# Patient Record
Sex: FEMALE | Race: WHITE | NOT HISPANIC OR LATINO | Employment: FULL TIME | URBAN - METROPOLITAN AREA
[De-identification: names, ages, dates, MRNs, and addresses within clinical notes are randomized per-mention and may not be internally consistent; named-entity substitution may affect disease eponyms.]

---

## 2019-02-05 ENCOUNTER — HOSPITAL ENCOUNTER (OUTPATIENT)
Dept: OTHER | Facility: HOSPITAL | Age: 24
Discharge: HOME | End: 2019-02-05

## 2019-02-05 LAB
ALBUMIN SERPL-MCNC: 4.2 G/DL (ref 3.5–5)
ALBUMIN/GLOB SERPL: 1.4 {RATIO} (ref 1.4–2.6)
ALP SERPL-CCNC: 46 U/L (ref 42–98)
ALT SERPL-CCNC: 35 U/L (ref 10–40)
ANION GAP SERPL CALC-SCNC: 16 MMOL/L (ref 8–19)
AST SERPL-CCNC: 31 U/L (ref 15–50)
BASOPHILS # BLD AUTO: 0.03 10*3/UL (ref 0–0.2)
BASOPHILS NFR BLD AUTO: 0.87 % (ref 0–3)
BILIRUB SERPL-MCNC: 0.63 MG/DL (ref 0.2–1.3)
BUN SERPL-MCNC: 8 MG/DL (ref 5–25)
BUN/CREAT SERPL: 13 {RATIO} (ref 6–20)
CALCIUM SERPL-MCNC: 9.5 MG/DL (ref 8.7–10.4)
CHLORIDE SERPL-SCNC: 104 MMOL/L (ref 99–111)
CHOLEST SERPL-MCNC: 163 MG/DL (ref 107–200)
CHOLEST/HDLC SERPL: 2.8 {RATIO} (ref 3–6)
CONV CO2: 26 MMOL/L (ref 22–32)
CONV TOTAL PROTEIN: 7.2 G/DL (ref 6.3–8.2)
CREAT UR-MCNC: 0.6 MG/DL (ref 0.5–0.9)
EOSINOPHIL # BLD AUTO: 0.21 10*3/UL (ref 0–0.7)
EOSINOPHIL # BLD AUTO: 6.72 % (ref 0–7)
ERYTHROCYTE [DISTWIDTH] IN BLOOD BY AUTOMATED COUNT: 11.2 % (ref 11.5–14.5)
GFR SERPLBLD BASED ON 1.73 SQ M-ARVRAT: >60 ML/MIN/{1.73_M2}
GLOBULIN UR ELPH-MCNC: 3 G/DL (ref 2–3.5)
GLUCOSE SERPL-MCNC: 76 MG/DL (ref 65–99)
HBA1C MFR BLD: 15 G/DL (ref 12–16)
HCT VFR BLD AUTO: 43.3 % (ref 37–47)
HDLC SERPL-MCNC: 58 MG/DL (ref 40–60)
LDLC SERPL CALC-MCNC: 88 MG/DL (ref 70–100)
LYMPHOCYTES # BLD AUTO: 1.43 10*3/UL (ref 1–5)
MCH RBC QN AUTO: 32 PG (ref 27–31)
MCHC RBC AUTO-ENTMCNC: 34.5 G/DL (ref 33–37)
MCV RBC AUTO: 92.8 FL (ref 81–99)
MONOCYTES # BLD AUTO: 0.32 10*3/UL (ref 0.2–1.2)
MONOCYTES NFR BLD AUTO: 10.4 % (ref 3–10)
NEUTROPHILS # BLD AUTO: 1.1 10*3/UL (ref 2–8)
NEUTROPHILS NFR BLD AUTO: 35.8 % (ref 30–85)
NRBC BLD AUTO-RTO: 0 % (ref 0–0.01)
OSMOLALITY SERPL CALC.SUM OF ELEC: 291 MOSM/KG (ref 273–304)
PLATELET # BLD AUTO: 227 10*3/UL (ref 130–400)
PMV BLD AUTO: 7.4 FL (ref 7.4–10.4)
POTASSIUM SERPL-SCNC: 4.3 MMOL/L (ref 3.5–5.3)
RBC # BLD AUTO: 4.67 10*6/UL (ref 4.2–5.4)
SODIUM SERPL-SCNC: 142 MMOL/L (ref 135–147)
TRIGL SERPL-MCNC: 83 MG/DL (ref 40–150)
TSH SERPL-ACNC: 1.48 M[IU]/L (ref 0.27–4.2)
VARIANT LYMPHS NFR BLD MANUAL: 46.3 % (ref 20–45)
VLDLC SERPL-MCNC: 17 MG/DL (ref 5–37)
WBC # BLD AUTO: 3.08 10*3/UL (ref 4.8–10.8)

## 2019-03-05 ENCOUNTER — HOSPITAL ENCOUNTER (OUTPATIENT)
Dept: OTHER | Facility: HOSPITAL | Age: 24
Discharge: HOME | End: 2019-03-05
Attending: NURSE PRACTITIONER

## 2019-03-05 LAB
ALT SERPL-CCNC: 11 U/L (ref 10–40)
AST SERPL-CCNC: 14 U/L (ref 15–50)
BASOPHILS # BLD AUTO: 0.03 10*3/UL (ref 0–0.2)
BASOPHILS NFR BLD AUTO: 0.8 % (ref 0–3)
CONV ABS IMM GRAN: 0 10*3/UL (ref 0–0.2)
CONV IMMATURE GRAN: 0 % (ref 0–1.8)
DEPRECATED RDW RBC AUTO: 43.7 FL (ref 36.4–46.3)
EOSINOPHIL # BLD AUTO: 0.29 10*3/UL (ref 0–0.7)
EOSINOPHIL # BLD AUTO: 7.3 % (ref 0–7)
ERYTHROCYTE [DISTWIDTH] IN BLOOD BY AUTOMATED COUNT: 12.4 % (ref 11.7–14.4)
HBA1C MFR BLD: 14.1 G/DL (ref 12–16)
HCT VFR BLD AUTO: 43 % (ref 37–47)
LYMPHOCYTES # BLD AUTO: 1.19 10*3/UL (ref 1–5)
MCH RBC QN AUTO: 31.5 PG (ref 27–31)
MCHC RBC AUTO-ENTMCNC: 32.8 G/DL (ref 33–37)
MCV RBC AUTO: 96.2 FL (ref 81–99)
MONOCYTES # BLD AUTO: 0.4 10*3/UL (ref 0.2–1.2)
MONOCYTES NFR BLD AUTO: 10.1 % (ref 3–10)
NEUTROPHILS # BLD AUTO: 2.04 10*3/UL (ref 2–8)
NEUTROPHILS NFR BLD AUTO: 51.7 % (ref 30–85)
NRBC CBCN: 0 % (ref 0–0.7)
PLATELET # BLD AUTO: 225 10*3/UL (ref 130–400)
PMV BLD AUTO: 9.8 FL (ref 9.4–12.3)
RBC # BLD AUTO: 4.47 10*6/UL (ref 4.2–5.4)
VARIANT LYMPHS NFR BLD MANUAL: 30.1 % (ref 20–45)
WBC # BLD AUTO: 3.95 10*3/UL (ref 4.8–10.8)

## 2019-05-17 ENCOUNTER — HOSPITAL ENCOUNTER (OUTPATIENT)
Dept: URGENT CARE | Facility: CLINIC | Age: 24
Discharge: HOME | End: 2019-05-17
Attending: FAMILY MEDICINE

## 2019-05-31 ENCOUNTER — HOSPITAL ENCOUNTER (OUTPATIENT)
Dept: URGENT CARE | Facility: CLINIC | Age: 24
Discharge: HOME | End: 2019-05-31

## 2019-06-02 LAB
AMOXICILLIN+CLAV SUSC ISLT: 4
AMPICILLIN SUSC ISLT: >=32
AMPICILLIN+SULBAC SUSC ISLT: 8
BACTERIA UR CULT: ABNORMAL
CEFAZOLIN SUSC ISLT: <=4
CEFEPIME SUSC ISLT: <=1
CEFTAZIDIME SUSC ISLT: <=1
CEFTRIAXONE SUSC ISLT: <=1
CEFUROXIME ORAL SUSC ISLT: <=1
CEFUROXIME PARENTER SUSC ISLT: <=1
CIPROFLOXACIN SUSC ISLT: <=0.25
ERTAPENEM SUSC ISLT: <=0.5
GENTAMICIN SUSC ISLT: <=1
LEVOFLOXACIN SUSC ISLT: <=0.12
NITROFURANTOIN SUSC ISLT: 64
TETRACYCLINE SUSC ISLT: <=1
TMP SMX SUSC ISLT: <=20
TOBRAMYCIN SUSC ISLT: <=1

## 2019-08-09 ENCOUNTER — HOSPITAL ENCOUNTER (OUTPATIENT)
Dept: URGENT CARE | Facility: CLINIC | Age: 24
Discharge: HOME | End: 2019-08-09
Attending: NURSE PRACTITIONER

## 2019-08-11 LAB — BACTERIA SPEC AEROBE CULT: NORMAL

## 2019-11-08 ENCOUNTER — HOSPITAL ENCOUNTER (OUTPATIENT)
Dept: URGENT CARE | Facility: CLINIC | Age: 24
Discharge: HOME | End: 2019-11-08
Attending: NURSE PRACTITIONER

## 2019-11-10 LAB — BACTERIA UR CULT: NORMAL

## 2019-11-17 ENCOUNTER — HOSPITAL ENCOUNTER (OUTPATIENT)
Dept: URGENT CARE | Facility: CLINIC | Age: 24
Discharge: HOME | End: 2019-11-17

## 2019-11-19 LAB — BACTERIA UR CULT: NORMAL

## 2020-01-31 ENCOUNTER — HOSPITAL ENCOUNTER (OUTPATIENT)
Dept: OTHER | Facility: HOSPITAL | Age: 25
Discharge: HOME | End: 2020-01-31

## 2020-01-31 LAB
ALBUMIN SERPL-MCNC: 4.3 G/DL (ref 3.5–5)
ALBUMIN/GLOB SERPL: 1.5 {RATIO} (ref 1.4–2.6)
ALP SERPL-CCNC: 53 U/L (ref 42–98)
ALT SERPL-CCNC: 23 U/L (ref 10–40)
ANION GAP SERPL CALC-SCNC: 14 MMOL/L (ref 8–19)
AST SERPL-CCNC: 19 U/L (ref 15–50)
BASOPHILS # BLD AUTO: 0.02 10*3/UL (ref 0–0.2)
BASOPHILS NFR BLD AUTO: 0.5 % (ref 0–3)
BILIRUB SERPL-MCNC: 0.54 MG/DL (ref 0.2–1.3)
BUN SERPL-MCNC: 9 MG/DL (ref 5–25)
BUN/CREAT SERPL: 15 {RATIO} (ref 6–20)
CALCIUM SERPL-MCNC: 9.4 MG/DL (ref 8.7–10.4)
CHLORIDE SERPL-SCNC: 101 MMOL/L (ref 99–111)
CHOLEST SERPL-MCNC: 174 MG/DL (ref 107–200)
CHOLEST/HDLC SERPL: 3.7 {RATIO} (ref 3–6)
CONV ABS IMM GRAN: 0.01 10*3/UL (ref 0–0.2)
CONV CO2: 25 MMOL/L (ref 22–32)
CONV IMMATURE GRAN: 0.2 % (ref 0–1.8)
CONV TOTAL PROTEIN: 7.2 G/DL (ref 6.3–8.2)
CREAT UR-MCNC: 0.6 MG/DL (ref 0.5–0.9)
DEPRECATED RDW RBC AUTO: 41.4 FL (ref 36.4–46.3)
EOSINOPHIL # BLD AUTO: 0.23 10*3/UL (ref 0–0.7)
EOSINOPHIL # BLD AUTO: 5.4 % (ref 0–7)
ERYTHROCYTE [DISTWIDTH] IN BLOOD BY AUTOMATED COUNT: 12 % (ref 11.7–14.4)
GFR SERPLBLD BASED ON 1.73 SQ M-ARVRAT: >60 ML/MIN/{1.73_M2}
GLOBULIN UR ELPH-MCNC: 2.9 G/DL (ref 2–3.5)
GLUCOSE SERPL-MCNC: 87 MG/DL (ref 65–99)
HCT VFR BLD AUTO: 43.6 % (ref 37–47)
HDLC SERPL-MCNC: 47 MG/DL (ref 40–60)
HGB BLD-MCNC: 14.4 G/DL (ref 12–16)
LDLC SERPL CALC-MCNC: 105 MG/DL (ref 70–100)
LYMPHOCYTES # BLD AUTO: 1.38 10*3/UL (ref 1–5)
LYMPHOCYTES NFR BLD AUTO: 32.2 % (ref 20–45)
MCH RBC QN AUTO: 31.2 PG (ref 27–31)
MCHC RBC AUTO-ENTMCNC: 33 G/DL (ref 33–37)
MCV RBC AUTO: 94.6 FL (ref 81–99)
MONOCYTES # BLD AUTO: 0.37 10*3/UL (ref 0.2–1.2)
MONOCYTES NFR BLD AUTO: 8.6 % (ref 3–10)
NEUTROPHILS # BLD AUTO: 2.28 10*3/UL (ref 2–8)
NEUTROPHILS NFR BLD AUTO: 53.1 % (ref 30–85)
NRBC CBCN: 0 % (ref 0–0.7)
OSMOLALITY SERPL CALC.SUM OF ELEC: 280 MOSM/KG (ref 273–304)
PLATELET # BLD AUTO: 254 10*3/UL (ref 130–400)
PMV BLD AUTO: 9.7 FL (ref 9.4–12.3)
POTASSIUM SERPL-SCNC: 4.3 MMOL/L (ref 3.5–5.3)
RBC # BLD AUTO: 4.61 10*6/UL (ref 4.2–5.4)
SODIUM SERPL-SCNC: 136 MMOL/L (ref 135–147)
TRIGL SERPL-MCNC: 108 MG/DL (ref 40–150)
TSH SERPL-ACNC: 2.44 M[IU]/L (ref 0.27–4.2)
VLDLC SERPL-MCNC: 22 MG/DL (ref 5–37)
WBC # BLD AUTO: 4.29 10*3/UL (ref 4.8–10.8)

## 2020-07-08 ENCOUNTER — HOSPITAL ENCOUNTER (OUTPATIENT)
Dept: URGENT CARE | Facility: CLINIC | Age: 25
Discharge: HOME | End: 2020-07-08
Attending: FAMILY MEDICINE

## 2020-10-21 ENCOUNTER — HOSPITAL ENCOUNTER (OUTPATIENT)
Dept: OTHER | Facility: HOSPITAL | Age: 25
Discharge: HOME | End: 2020-10-21
Attending: OBSTETRICS & GYNECOLOGY

## 2020-10-21 LAB
C TRACH RRNA CVX QL NAA+PROBE: NOT DETECTED
N GONORRHOEA DNA SPEC QL NAA+PROBE: NOT DETECTED

## 2020-10-27 ENCOUNTER — HOSPITAL ENCOUNTER (OUTPATIENT)
Dept: URGENT CARE | Facility: CLINIC | Age: 25
Discharge: HOME | End: 2020-10-27
Attending: FAMILY MEDICINE

## 2020-10-27 LAB
CONV LAST MENSTURAL PERIOD: NORMAL
SPECIMEN SOURCE: NORMAL
SPECIMEN SOURCE: NORMAL
THIN PREP CVX: NORMAL

## 2020-10-31 LAB — SARS-COV-2 RNA SPEC QL NAA+PROBE: DETECTED

## 2021-01-20 ENCOUNTER — HOSPITAL ENCOUNTER (OUTPATIENT)
Dept: OTHER | Facility: HOSPITAL | Age: 26
Discharge: HOME | End: 2021-01-20

## 2021-01-20 LAB
ALBUMIN SERPL-MCNC: 4 G/DL (ref 3.5–5)
ALBUMIN/GLOB SERPL: 1.5 {RATIO} (ref 1.4–2.6)
ALP SERPL-CCNC: 52 U/L (ref 42–98)
ALT SERPL-CCNC: 21 U/L (ref 10–40)
ANION GAP SERPL CALC-SCNC: 14 MMOL/L (ref 8–19)
AST SERPL-CCNC: 18 U/L (ref 15–50)
BASOPHILS # BLD AUTO: 0.03 10*3/UL (ref 0–0.2)
BASOPHILS NFR BLD AUTO: 0.5 % (ref 0–3)
BILIRUB SERPL-MCNC: 0.28 MG/DL (ref 0.2–1.3)
BUN SERPL-MCNC: 10 MG/DL (ref 5–25)
BUN/CREAT SERPL: 18 {RATIO} (ref 6–20)
CALCIUM SERPL-MCNC: 8.8 MG/DL (ref 8.7–10.4)
CHLORIDE SERPL-SCNC: 105 MMOL/L (ref 99–111)
CHOLEST SERPL-MCNC: 153 MG/DL (ref 107–200)
CHOLEST/HDLC SERPL: 2.6 {RATIO} (ref 3–6)
CONV ABS IMM GRAN: 0.01 10*3/UL (ref 0–0.2)
CONV CO2: 23 MMOL/L (ref 22–32)
CONV IMMATURE GRAN: 0.2 % (ref 0–1.8)
CONV TOTAL PROTEIN: 6.6 G/DL (ref 6.3–8.2)
CREAT UR-MCNC: 0.57 MG/DL (ref 0.5–0.9)
DEPRECATED RDW RBC AUTO: 41.1 FL (ref 36.4–46.3)
EOSINOPHIL # BLD AUTO: 0.28 10*3/UL (ref 0–0.7)
EOSINOPHIL # BLD AUTO: 5.1 % (ref 0–7)
ERYTHROCYTE [DISTWIDTH] IN BLOOD BY AUTOMATED COUNT: 12 % (ref 11.7–14.4)
GFR SERPLBLD BASED ON 1.73 SQ M-ARVRAT: >60 ML/MIN/{1.73_M2}
GLOBULIN UR ELPH-MCNC: 2.6 G/DL (ref 2–3.5)
GLUCOSE SERPL-MCNC: 96 MG/DL (ref 65–99)
HCT VFR BLD AUTO: 42.4 % (ref 37–47)
HDLC SERPL-MCNC: 59 MG/DL (ref 40–60)
HGB BLD-MCNC: 14.3 G/DL (ref 12–16)
LDLC SERPL CALC-MCNC: 81 MG/DL (ref 70–100)
LYMPHOCYTES # BLD AUTO: 2.55 10*3/UL (ref 1–5)
LYMPHOCYTES NFR BLD AUTO: 46.3 % (ref 20–45)
MCH RBC QN AUTO: 31.2 PG (ref 27–31)
MCHC RBC AUTO-ENTMCNC: 33.7 G/DL (ref 33–37)
MCV RBC AUTO: 92.6 FL (ref 81–99)
MONOCYTES # BLD AUTO: 0.51 10*3/UL (ref 0.2–1.2)
MONOCYTES NFR BLD AUTO: 9.3 % (ref 3–10)
NEUTROPHILS # BLD AUTO: 2.13 10*3/UL (ref 2–8)
NEUTROPHILS NFR BLD AUTO: 38.6 % (ref 30–85)
NRBC CBCN: 0 % (ref 0–0.7)
OSMOLALITY SERPL CALC.SUM OF ELEC: 285 MOSM/KG (ref 273–304)
PLATELET # BLD AUTO: 282 10*3/UL (ref 130–400)
PMV BLD AUTO: 9.5 FL (ref 9.4–12.3)
POTASSIUM SERPL-SCNC: 3.9 MMOL/L (ref 3.5–5.3)
RBC # BLD AUTO: 4.58 10*6/UL (ref 4.2–5.4)
SODIUM SERPL-SCNC: 138 MMOL/L (ref 135–147)
TRIGL SERPL-MCNC: 65 MG/DL (ref 40–150)
TSH SERPL-ACNC: 3.76 M[IU]/L (ref 0.27–4.2)
VLDLC SERPL-MCNC: 13 MG/DL (ref 5–37)
WBC # BLD AUTO: 5.51 10*3/UL (ref 4.8–10.8)

## 2021-01-29 ENCOUNTER — HOSPITAL ENCOUNTER (OUTPATIENT)
Dept: URGENT CARE | Facility: CLINIC | Age: 26
Discharge: HOME | End: 2021-01-29
Attending: NURSE PRACTITIONER

## 2021-02-03 LAB — SARS-COV-2 RNA SPEC QL NAA+PROBE: NOT DETECTED

## 2021-04-08 ENCOUNTER — HOSPITAL ENCOUNTER (OUTPATIENT)
Dept: VACCINE CLINIC | Facility: HOSPITAL | Age: 26
Discharge: HOME | End: 2021-04-08
Attending: INTERNAL MEDICINE

## 2021-04-28 ENCOUNTER — HOSPITAL ENCOUNTER (OUTPATIENT)
Dept: OTHER | Facility: HOSPITAL | Age: 26
Discharge: HOME | End: 2021-04-28
Attending: NURSE PRACTITIONER

## 2021-04-28 LAB
C TRACH RRNA CVX QL NAA+PROBE: NOT DETECTED
N GONORRHOEA DNA SPEC QL NAA+PROBE: NOT DETECTED

## 2021-04-29 ENCOUNTER — HOSPITAL ENCOUNTER (OUTPATIENT)
Dept: VACCINE CLINIC | Facility: HOSPITAL | Age: 26
Discharge: HOME | End: 2021-04-29
Attending: INTERNAL MEDICINE

## 2022-02-23 ENCOUNTER — TRANSCRIBE ORDERS (OUTPATIENT)
Dept: ADMINISTRATIVE | Facility: HOSPITAL | Age: 27
End: 2022-02-23

## 2022-02-23 DIAGNOSIS — R59.0 VIRCHOW'S NODE: ICD-10-CM

## 2022-02-23 DIAGNOSIS — N63.23 BREAST LUMP ON LEFT SIDE AT 4 O'CLOCK POSITION: Primary | ICD-10-CM

## 2022-02-25 ENCOUNTER — TRANSCRIBE ORDERS (OUTPATIENT)
Dept: ADMINISTRATIVE | Facility: HOSPITAL | Age: 27
End: 2022-02-25

## 2022-02-25 DIAGNOSIS — R59.0 VIRCHOW'S NODE: ICD-10-CM

## 2022-02-25 DIAGNOSIS — N63.23 BREAST LUMP ON LEFT SIDE AT 4 O'CLOCK POSITION: Primary | ICD-10-CM

## 2022-02-25 DIAGNOSIS — R59.0 LOCALIZED ENLARGED LYMPH NODES: ICD-10-CM

## 2022-02-25 DIAGNOSIS — N63.20 LEFT BREAST MASS: Primary | ICD-10-CM

## 2022-03-11 ENCOUNTER — APPOINTMENT (OUTPATIENT)
Dept: ULTRASOUND IMAGING | Facility: HOSPITAL | Age: 27
End: 2022-03-11

## 2022-03-16 ENCOUNTER — APPOINTMENT (OUTPATIENT)
Dept: ULTRASOUND IMAGING | Facility: HOSPITAL | Age: 27
End: 2022-03-16

## 2022-03-16 ENCOUNTER — HOSPITAL ENCOUNTER (OUTPATIENT)
Dept: MAMMOGRAPHY | Facility: HOSPITAL | Age: 27
Discharge: HOME | End: 2022-03-16

## 2022-03-16 ENCOUNTER — APPOINTMENT (OUTPATIENT)
Dept: MAMMOGRAPHY | Facility: HOSPITAL | Age: 27
End: 2022-03-16

## 2022-03-16 ENCOUNTER — HOSPITAL ENCOUNTER (OUTPATIENT)
Dept: ULTRASOUND IMAGING | Facility: HOSPITAL | Age: 27
Discharge: HOME | End: 2022-03-16

## 2022-03-16 DIAGNOSIS — N63.23 BREAST LUMP ON LEFT SIDE AT 4 O'CLOCK POSITION: ICD-10-CM

## 2022-03-16 DIAGNOSIS — N63.20 LEFT BREAST MASS: ICD-10-CM

## 2022-03-16 DIAGNOSIS — R59.0 LOCALIZED ENLARGED LYMPH NODES: ICD-10-CM

## 2022-03-16 DIAGNOSIS — R59.0 VIRCHOW'S NODE: ICD-10-CM

## 2022-03-16 PROCEDURE — 76642 ULTRASOUND BREAST LIMITED: CPT

## 2022-03-16 PROCEDURE — 77066 DX MAMMO INCL CAD BI: CPT

## 2022-03-16 PROCEDURE — 76882 US LMTD JT/FCL EVL NVASC XTR: CPT

## 2022-03-16 PROCEDURE — G0279 TOMOSYNTHESIS, MAMMO: HCPCS

## 2023-02-03 ENCOUNTER — TREATMENT (OUTPATIENT)
Dept: PHYSICAL THERAPY | Facility: CLINIC | Age: 28
End: 2023-02-03
Payer: OTHER MISCELLANEOUS

## 2023-02-03 ENCOUNTER — TRANSCRIBE ORDERS (OUTPATIENT)
Dept: PHYSICAL THERAPY | Facility: CLINIC | Age: 28
End: 2023-02-03
Payer: COMMERCIAL

## 2023-02-03 DIAGNOSIS — S39.012A STRAIN OF LUMBAR REGION, INITIAL ENCOUNTER: Primary | ICD-10-CM

## 2023-02-03 DIAGNOSIS — M25.60 STIFFNESS IN JOINT: ICD-10-CM

## 2023-02-03 DIAGNOSIS — S39.012D STRAIN OF LUMBAR REGION, SUBSEQUENT ENCOUNTER: Primary | ICD-10-CM

## 2023-02-03 PROCEDURE — 97014 ELECTRIC STIMULATION THERAPY: CPT | Performed by: PHYSICAL THERAPIST

## 2023-02-03 PROCEDURE — 97161 PT EVAL LOW COMPLEX 20 MIN: CPT | Performed by: PHYSICAL THERAPIST

## 2023-02-03 PROCEDURE — 97110 THERAPEUTIC EXERCISES: CPT | Performed by: PHYSICAL THERAPIST
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
## 2023-02-07 ENCOUNTER — TREATMENT (OUTPATIENT)
Dept: PHYSICAL THERAPY | Facility: CLINIC | Age: 28
End: 2023-02-07
Payer: OTHER MISCELLANEOUS

## 2023-02-07 DIAGNOSIS — S39.012D STRAIN OF LUMBAR REGION, SUBSEQUENT ENCOUNTER: Primary | ICD-10-CM

## 2023-02-07 DIAGNOSIS — M25.60 STIFFNESS IN JOINT: ICD-10-CM

## 2023-02-07 PROCEDURE — 97110 THERAPEUTIC EXERCISES: CPT | Performed by: PHYSICAL THERAPIST

## 2023-02-07 PROCEDURE — 97014 ELECTRIC STIMULATION THERAPY: CPT | Performed by: PHYSICAL THERAPIST

## 2023-02-07 PROCEDURE — 97530 THERAPEUTIC ACTIVITIES: CPT | Performed by: PHYSICAL THERAPIST

## 2023-02-07 NOTE — PROGRESS NOTES
Outpatient Physical Therapy  [address] [facility], [place] [address]                 Physical Therapy Daily Treatment Note    Patient: Keely Wing   : 1995  Diagnosis/ICD-10 Code:  Strain of lumbar region, subsequent encounter [S39.012D]  Referring practitioner: Rakesh Gillespie MD  Date of Initial Visit: Type: THERAPY  Noted: 2/3/2023  Today's Date: 2023  Patient seen for 2 sessions             Subjective   Keely Wing reports: 5/10 pain in her spine from neck to hips at time of arrival. Patient reports pain is worst in thoracic and lumbar spine. Patient reported taking pain medication before today's appointment. Pain reduced to 3/10 pain at end of session with use of e-stim and moist heat.     Objective     See Exercise, Manual, and Modality Logs for complete treatment.     Assessment/Plan  Keely is just beginning care to attend to deficits outlined in evaluation, requiring further therapist directed strengthening. Patient had some increased discomfort during exercises that was resolved with cuing or modification. Assess how patient tolerated exercises next session.    Progress per Plan of Care       Timed:  Manual Therapy:    0     mins  66927;  Therapeutic Exercise:    30     mins  14344;     Neuromuscular Pako:    0    mins  65221;    Therapeutic Activity:     10     mins  58292;     Gait Trainin     mins  79354;    Aquatic Therapy:     0     mins  48507;       Untimed:  Electrical Stimulation:    15     mins  05864 ( );  Mechanical Traction:    0     mins  12450;       Timed Treatment:   40   mins   Total Treatment:     55   mins      Electronically signed:   Brigitte Grimm PTA  Physical Therapist Assistant  [facility] License #: [id number]

## 2023-02-08 ENCOUNTER — TREATMENT (OUTPATIENT)
Dept: PHYSICAL THERAPY | Facility: CLINIC | Age: 28
End: 2023-02-08
Payer: OTHER MISCELLANEOUS

## 2023-02-08 DIAGNOSIS — M25.60 STIFFNESS IN JOINT: ICD-10-CM

## 2023-02-08 DIAGNOSIS — S39.012D STRAIN OF LUMBAR REGION, SUBSEQUENT ENCOUNTER: Primary | ICD-10-CM

## 2023-02-08 PROCEDURE — 97014 ELECTRIC STIMULATION THERAPY: CPT | Performed by: PHYSICAL THERAPIST

## 2023-02-08 PROCEDURE — 97110 THERAPEUTIC EXERCISES: CPT | Performed by: PHYSICAL THERAPIST

## 2023-02-08 PROCEDURE — 97530 THERAPEUTIC ACTIVITIES: CPT | Performed by: PHYSICAL THERAPIST

## 2023-02-08 NOTE — PROGRESS NOTES
Outpatient Physical Therapy  [address] [facility], [place] [address]                 Physical Therapy Daily Treatment Note    Patient: Keely Wing   : 1995  Diagnosis/ICD-10 Code:  Strain of lumbar region, subsequent encounter [S39.012D]  Referring practitioner: Rakesh Gillespie MD  Date of Initial Visit: Type: THERAPY  Noted: 2/3/2023  Today's Date: 2023  Patient seen for 3 sessions             Subjective   Keely Wing reports: she thinks therapy helped her pain after last session.     Pain: 5/10 pain from neck to low back at time of arrival. Patient states she took OTC pain medication about an hour before today's appointment. Patient pain increased to 7/10 pain with exercises but decreased to 3/10 pain with modalities.     Objective     See Exercise, Manual, and Modality Logs for complete treatment.     Assessment/Plan  Keely progressing as evident by increased tolerance to exercises and reduced cuing. Pt tolerated exercises well, just general fatigue. Pt would benefit from skilled PT to address Range of Motion  and Strength deficits, pain management and any concerns with ADLs.     Progress per Plan of Care       Timed:  Manual Therapy:    0     mins  83813;  Therapeutic Exercise:    22     mins  14103;     Neuromuscular Pako:    0    mins  01789;    Therapeutic Activity:     8     mins  00010;     Gait Trainin     mins  60884;    Aquatic Therapy:     0     mins  80435;       Untimed:  Electrical Stimulation:    15     mins  82925 ( );  Mechanical Traction:    0     mins  17106;       Timed Treatment:   30   mins   Total Treatment:     45   mins      Electronically signed:   Brigitte Grimm PTA  Physical Therapist Assistant  \[facility]\"" License #: [id number]

## 2023-02-14 ENCOUNTER — TELEPHONE (OUTPATIENT)
Dept: PHYSICAL THERAPY | Facility: CLINIC | Age: 28
End: 2023-02-14

## 2023-02-15 ENCOUNTER — TELEPHONE (OUTPATIENT)
Dept: PHYSICAL THERAPY | Facility: CLINIC | Age: 28
End: 2023-02-15

## 2023-02-17 ENCOUNTER — TELEPHONE (OUTPATIENT)
Dept: PHYSICAL THERAPY | Facility: OTHER | Age: 28
End: 2023-02-17
Payer: COMMERCIAL

## 2023-02-17 NOTE — TELEPHONE ENCOUNTER
Caller: Keely Wing    Relationship: Self    What was the call regarding:CANCELLED TODAYS APPT BECAUSE HASNT RECIEVED AUTH

## 2023-02-20 ENCOUNTER — TELEPHONE (OUTPATIENT)
Dept: PHYSICAL THERAPY | Facility: CLINIC | Age: 28
End: 2023-02-20

## 2023-02-23 ENCOUNTER — TELEPHONE (OUTPATIENT)
Dept: PHYSICAL THERAPY | Facility: CLINIC | Age: 28
End: 2023-02-23

## 2023-03-07 PROCEDURE — U0004 COV-19 TEST NON-CDC HGH THRU: HCPCS | Performed by: NURSE PRACTITIONER

## 2023-03-07 PROCEDURE — 87081 CULTURE SCREEN ONLY: CPT | Performed by: NURSE PRACTITIONER

## 2023-03-09 ENCOUNTER — TELEPHONE (OUTPATIENT)
Dept: URGENT CARE | Facility: CLINIC | Age: 28
End: 2023-03-09
Payer: COMMERCIAL

## 2023-03-09 NOTE — TELEPHONE ENCOUNTER
----- Message from HEBER Red sent at 3/8/2023  6:04 PM EST -----  Please notify patient of negative COVID-19 test result.

## 2023-03-10 ENCOUNTER — TELEPHONE (OUTPATIENT)
Dept: URGENT CARE | Facility: CLINIC | Age: 28
End: 2023-03-10
Payer: COMMERCIAL

## 2023-03-10 NOTE — TELEPHONE ENCOUNTER
----- Message from David Laws MD sent at 3/10/2023 12:04 PM EST -----  Patient alerted yesterday of results of covid - backup strep negative today

## 2023-03-31 ENCOUNTER — TREATMENT (OUTPATIENT)
Dept: PHYSICAL THERAPY | Facility: CLINIC | Age: 28
End: 2023-03-31
Payer: OTHER MISCELLANEOUS

## 2023-03-31 DIAGNOSIS — M25.60 STIFFNESS IN JOINT: ICD-10-CM

## 2023-03-31 DIAGNOSIS — S39.012D STRAIN OF LUMBAR REGION, SUBSEQUENT ENCOUNTER: Primary | ICD-10-CM

## 2023-03-31 PROCEDURE — 97110 THERAPEUTIC EXERCISES: CPT | Performed by: PHYSICAL THERAPIST

## 2023-03-31 PROCEDURE — 97530 THERAPEUTIC ACTIVITIES: CPT | Performed by: PHYSICAL THERAPIST

## 2023-03-31 NOTE — PROGRESS NOTES
Progress Note  [facility] [address] [place] [address]      Patient: Keely Wing   : 1995  Diagnosis/ICD-10 Code:  Strain of lumbar region, subsequent encounter [S39.012D]  Referring practitioner: Rakesh Gillespie MD  Date of Initial Visit: Type: THERAPY  Noted: 2/3/2023  Today's Date: 3/31/2023  Patient seen for 4 sessions      Subjective:   Subjective Questionnaire: Oswestry:  = 24% Disability  Clinical Progress: unchanged  Home Program Compliance: Yes  Treatment has included: therapeutic exercise, manual therapy and therapeutic activity    Subjective   The patient reported that her pain level is an 8/10 today. The pain is located mainly in her neck and between her shoulder blades. Her pain is about the same as when she started PT. She is still light duty at work with a 5 lb lifting restriction. She denies any radicular symptoms in to her arms. She denies headaches and dizziness. She has had to miss therapy for the past month due to waiting in work comp to approve additional visits. She would like to continue with PT as she has barely been able to attend up to this point due to authorizations issues.     Objective          Tenderness     Additional Tenderness Details  Tenderness to palpation of thoracic paraspinals and rhomboids bilaterally.    Neurological Testing     Additional Neurological Details  Sensation to light touch intact and equal bilaterally from C2-T1 and L2-S1 dermatomal levels.    Supine slump: (-) bilaterally for increased sciatic neural tension    Supine passive SLR: (-) bilaterally for increased sciatic neural tension        Active Range of Motion   Cervical/Thoracic Spine   Normal active range of motion    Lumbar   Flexion: 70 degrees   Extension: 10 degrees with pain  Left lateral flexion: WFL  Right lateral flexion: WFL  Left rotation: WFL  Right rotation: WFL    Strength/Myotome Testing     Left Shoulder     Planes of Motion   Flexion: 5   Extension: 5    Abduction: 4+   External rotation at 0°: 5   Internal rotation at 0°: 5     Right Shoulder     Planes of Motion   Flexion: 5   Extension: 5   Abduction: 4+   External rotation at 0°: 5   Internal rotation at 0°: 5     Left Elbow   Flexion: 5    Right Elbow   Flexion: 5    Left Wrist/Hand   Wrist extension: 5    Right Wrist/Hand   Wrist extension: 5    Left Hip   Planes of Motion   Flexion: 4  Extension: 4+  Abduction: 4+    Right Hip   Planes of Motion   Flexion: 4  Extension: 4+  Abduction: 4+    Left Knee   Flexion: 5  Extension: 5    Right Knee   Flexion: 5  Extension: 5    Left Ankle/Foot   Dorsiflexion: 5    Right Ankle/Foot   Dorsiflexion: 5    Ambulation     Ambulation: Level Surfaces     Additional Level Surfaces Ambulation Details  The patient ambulates with normal biomechanics.      See Exercise, Manual, and Modality Logs for complete treatment.     Assessment & Plan     Assessment    Assessment details: The patient was re-evaluated today and presents with improvements in shoulder strength, knee strength, and had less pain with lumbar ROM and palpation exam.  Although improved, she continues to present with a high level of reported pain in her thoracic spine that is somewhat inconsistent with strength and ROM values. She has been limited in progression secondary to insurance authorization issues. She missed the last 7 weeks of therapy due to not having insurance authorization. She would benefit from continued skilled physical therapy to address remaining functional deficits and to allow the patient to return to her prior level of function.    Goals  Plan Goals: LOW BACK PROBLEMS:    1. The patient complains of low back pain.  LTG 1: 12 weeks:  The patient will report a pain rating of 1/10 or better in order to improve  tolerance to activities of daily living and improve sleep quality.  STATUS:  Progressing  STG 1a: 6 weeks:  The patient will report a pain rating of 3/10 or better.  STATUS:   Progressing    2. The patient demonstrates weakness of the bilateral hips.  LTG 2: 12 weeks:  The patient will demonstrate 5 /5 strength for bilateral hip flexion, abduction,  and extension in order to improve hip stability.  STATUS:  Progressing  STG 2a: 6 weeks:  The patient will demonstrate 4+ /5 strength for bilateral hip flexion, abduction,  and extension.  STATUS:  Progressing    3. Mobility: Walking/Moving Around Functional Limitation                   LTG 3: 12 weeks:  The patient will demonstrate 0 % limitation by achieving a score of 0/50 on the KASIE.  STATUS:  Progressing  STG 3 a: 6 weeks:  The patient will demonstrate 10 % limitation by achieving a score of 5/50 on the KASIE.    STATUS:  Progressing      Progress toward previous goals: Partially Met      Recommendations: Continue as planned  Timeframe: 1 month  Prognosis to achieve goals: fair    PT Signature: Abad Faye PT    Electronically signed 3/31/2023    [place] License: PT - [id number]       Timed:  Manual Therapy:    0     mins  23744;  Therapeutic Exercise:    24     mins  76917;     Neuromuscular Pako:    0    mins  04336;    Therapeutic Activity:     10     mins  45144;     Gait Trainin     mins  47585;     Aquatics                         0      mins  64604    Un-timed:  Mechanical Traction      0     mins  46188  Dry Needling     0     mins self-pay  Electrical Stimulation:    0     mins  00097 ( );    Timed Treatment:   34   mins   Total Treatment:     34   mins

## 2023-04-03 ENCOUNTER — TREATMENT (OUTPATIENT)
Dept: PHYSICAL THERAPY | Facility: CLINIC | Age: 28
End: 2023-04-03
Payer: OTHER MISCELLANEOUS

## 2023-04-03 DIAGNOSIS — M25.60 STIFFNESS IN JOINT: ICD-10-CM

## 2023-04-03 DIAGNOSIS — S39.012D STRAIN OF LUMBAR REGION, SUBSEQUENT ENCOUNTER: Primary | ICD-10-CM

## 2023-04-03 PROCEDURE — 97112 NEUROMUSCULAR REEDUCATION: CPT | Performed by: PHYSICAL THERAPIST

## 2023-04-03 PROCEDURE — 97110 THERAPEUTIC EXERCISES: CPT | Performed by: PHYSICAL THERAPIST

## 2023-04-03 PROCEDURE — 97530 THERAPEUTIC ACTIVITIES: CPT | Performed by: PHYSICAL THERAPIST

## 2023-04-03 NOTE — PROGRESS NOTES
Outpatient Physical Therapy  [address] [facility], [place] [address]                 Physical Therapy Daily Treatment Note    Patient: Keely Wing   : 1995  Diagnosis/ICD-10 Code:  Strain of lumbar region, subsequent encounter [S39.012D]  Referring practitioner: Rakesh Gillespie MD  Date of Initial Visit: Type: THERAPY  Noted: 2/3/2023  Today's Date: 4/3/2023  Patient seen for 5 sessions             Subjective   Keely Wing reports: 9/10 pain in mid to low back at time of arrival. Quality of pain is described as burning.     Objective     See Exercise, Manual, and Modality Logs for complete treatment.     Assessment/Plan  Keely still experiencing increased back pain, especially after work. Pt would benefit from skilled PT to address Range of Motion and Strength deficits, pain management and any concerns with ADLs.     Progress per Plan of Care       Timed:  Manual Therapy:    0     mins  95736;  Therapeutic Exercise:    12     mins  48852;     Neuromuscular Pako:    8    mins  04521;    Therapeutic Activity:     10     mins  46749;     Gait Trainin     mins  36610;    Aquatic Therapy:     0     mins  20907;       Untimed:  Electrical Stimulation:    0     mins  44390 ( );  Mechanical Traction:    0     mins  86669;     Timed Treatment:   30   mins   Total Treatment:     30   mins      Electronically signed:   Brigitte Grimm PTA  Physical Therapist Assistant  [facility] License #: [id number]

## 2023-04-06 ENCOUNTER — TREATMENT (OUTPATIENT)
Dept: PHYSICAL THERAPY | Facility: CLINIC | Age: 28
End: 2023-04-06
Payer: OTHER MISCELLANEOUS

## 2023-04-06 DIAGNOSIS — S39.012D STRAIN OF LUMBAR REGION, SUBSEQUENT ENCOUNTER: Primary | ICD-10-CM

## 2023-04-06 DIAGNOSIS — M25.60 STIFFNESS IN JOINT: ICD-10-CM

## 2023-04-06 PROCEDURE — 97530 THERAPEUTIC ACTIVITIES: CPT | Performed by: PHYSICAL THERAPIST

## 2023-04-06 PROCEDURE — 97110 THERAPEUTIC EXERCISES: CPT | Performed by: PHYSICAL THERAPIST

## 2023-04-06 NOTE — PROGRESS NOTES
Outpatient Physical Therapy  [address] [facility], [place] [address]                 Physical Therapy Daily Treatment Note    Patient: Keely Wing   : 1995  Diagnosis/ICD-10 Code:  Strain of lumbar region, subsequent encounter [S39.012D]  Referring practitioner: Rakesh Gillespie MD  Date of Initial Visit: Type: THERAPY  Noted: 2/3/2023  Today's Date: 2023  Patient seen for 6 sessions             Subjective   Keely Wing reports: 8/10 pain in thoracic and lumbar spine. Patient states her pain is burning and constant. Patient states no activities change the pain.     Objective     See Exercise, Manual, and Modality Logs for complete treatment.     Assessment/Plan  Keely still experiencing increased back pain. Pt had some increased discomfort with external rotation. Pt would benefit from skilled PT to address Range of Motion and Strength deficits, pain management and any concerns with ADLs.     Progress per Plan of Care       Timed:  Manual Therapy:    0     mins  17003;  Therapeutic Exercise:    15     mins  77917;     Neuromuscular Pako:    0    mins  55709;    Therapeutic Activity:     10     mins  40935;     Gait Trainin     mins  06057;    Aquatic Therapy:     0     mins  81967;       Untimed:  Electrical Stimulation:    0     mins  41927 ( );  Mechanical Traction:    0     mins  85410;       Timed Treatment:   25   mins   Total Treatment:     25   mins      Electronically signed:   Brigitte Grimm PTA  Physical Therapist Assistant  [facility] License #: [id number]

## 2023-04-10 ENCOUNTER — TREATMENT (OUTPATIENT)
Dept: PHYSICAL THERAPY | Facility: CLINIC | Age: 28
End: 2023-04-10
Payer: OTHER MISCELLANEOUS

## 2023-04-10 DIAGNOSIS — M25.60 STIFFNESS IN JOINT: ICD-10-CM

## 2023-04-10 DIAGNOSIS — S39.012D STRAIN OF LUMBAR REGION, SUBSEQUENT ENCOUNTER: Primary | ICD-10-CM

## 2023-04-10 PROCEDURE — 97014 ELECTRIC STIMULATION THERAPY: CPT | Performed by: PHYSICAL THERAPIST

## 2023-04-10 PROCEDURE — 97110 THERAPEUTIC EXERCISES: CPT | Performed by: PHYSICAL THERAPIST

## 2023-04-10 PROCEDURE — 97530 THERAPEUTIC ACTIVITIES: CPT | Performed by: PHYSICAL THERAPIST

## 2023-04-10 NOTE — PROGRESS NOTES
"Outpatient Physical Therapy  [address] [facility], [place] [address]                 Physical Therapy Daily Treatment Note    Patient: Keely Wing   : 1995  Diagnosis/ICD-10 Code:  Strain of lumbar region, subsequent encounter [S39.012D]  Referring practitioner: Rakesh Gillespie MD  Date of Initial Visit: Type: THERAPY  Noted: 2/3/2023  Today's Date: 4/10/2023  Patient seen for 7 sessions             Subjective   Keely Wing reports: over the weekend she wore a dress that was tight around the back and patient noted increased pain during the day that was relived by changing into loose fitting clothes.     Pain: 7/10 pain, burning pain. Patient describes \"it feels like someone is taking a lighter and running it up and down my spine\"     Objective     See Exercise, Manual, and Modality Logs for complete treatment.     Assessment/Plan  Keely still experiencing increased back pain, especially when wearing tight clothing. Patient states interferential electrical stimulation is helping her pain and would benefit from a Flex IT unit at home. Pt had some increased discomfort with resisted shoulder rotation. Pt would benefit from skilled PT to address Range of Motion and Strength deficits, pain management and any concerns with ADLs.     Progress per Plan of Care       Timed:  Manual Therapy:    0     mins  54134;  Therapeutic Exercise:    18     mins  29532;     Neuromuscular Pako:    0    mins  88857;    Therapeutic Activity:     12     mins  49768;     Gait Trainin     mins  30351;    Aquatic Therapy:     0     mins  17377;       Untimed:  Electrical Stimulation:    15     mins  61582 ( );  Mechanical Traction:    0     mins  93661;       Timed Treatment:   30   mins   Total Treatment:     45   mins      Electronically signed:   Brigitte Grimm PTA  Physical Therapist Assistant  [facility] License #: [id number]  "

## 2023-04-12 ENCOUNTER — TREATMENT (OUTPATIENT)
Dept: PHYSICAL THERAPY | Facility: CLINIC | Age: 28
End: 2023-04-12
Payer: OTHER MISCELLANEOUS

## 2023-04-12 DIAGNOSIS — S39.012D STRAIN OF LUMBAR REGION, SUBSEQUENT ENCOUNTER: Primary | ICD-10-CM

## 2023-04-12 DIAGNOSIS — M25.60 STIFFNESS IN JOINT: ICD-10-CM

## 2023-04-12 PROCEDURE — 97530 THERAPEUTIC ACTIVITIES: CPT | Performed by: PHYSICAL THERAPIST

## 2023-04-12 PROCEDURE — 97140 MANUAL THERAPY 1/> REGIONS: CPT | Performed by: PHYSICAL THERAPIST

## 2023-04-12 NOTE — PROGRESS NOTES
"Physical Therapy Daily Treatment Note  [place] [facility] [address] [place] [address]    Patient: Keely Wing   : 1995  Referring practitioner: Rakesh Gillespie MD  Date of Initial Visit: Type: THERAPY  Noted: 2/3/2023  Today's Date: 2023  Patient seen for 8 sessions           Subjective  Keely Wing reports: her pain is 7/10 at arrival. She c/o constant pain, \"feels like burning going up my spine.\" Keely reports she is open to suggestions as this has been going on since January.       Objective   During postural awareness at mirror, Keely stated she was told by the seamstress when she was being fit for her wedding dress that she has scoliosis.  PTA educated Keely that we can have functional changes that mimic scoliosis or that she may have a true structural scoliosis. During awareness activities, with VC and tactile prompts she was able to see the change in her stance, resolving the issue of L shoulder elevation, L shoulder anterior placement, R trunk rotation and R illium elevation.    See Exercise, Manual, and Modality Logs for complete treatment.       Assessment/Plan  Keely is observed to be hyperflexible, evident with her shoulder and elbow movement. She reported no change in pain after session. PTA felt improved intervertebral mobility during manual work. Continued per outlined POC.     Visit Diagnoses:    ICD-10-CM ICD-9-CM   1. Strain of lumbar region, subsequent encounter  S39.012D V58.89     847.2   2. Stiffness in joint  M25.60 719.50       Progress per Plan of Care and Progress strengthening /stabilization /functional activity           Timed:  Manual Therapy:    23     mins  49496;  Therapeutic Exercise:         mins  58477;     Neuromuscular Pako:        mins  29312;    Therapeutic Activity:     12     mins  88947;     Gait Training:           mins  44806;     Ultrasound:          mins  33844;    Electrical Stimulation:         mins  42547 (MC " );  Aquatics  __   mins   25407    Untimed:  Electrical Stimulation:         mins  02235 ( );  Mechanical Traction:         mins  92592;     Timed Treatment:   35   mins   Total Treatment:     35   mins    Electronically Signed:  Maru Eubanks PTA  Physical Therapist Assistant    [facility] license [id number]

## 2023-04-17 ENCOUNTER — TREATMENT (OUTPATIENT)
Dept: PHYSICAL THERAPY | Facility: CLINIC | Age: 28
End: 2023-04-17
Payer: OTHER MISCELLANEOUS

## 2023-04-17 DIAGNOSIS — M25.60 STIFFNESS IN JOINT: ICD-10-CM

## 2023-04-17 DIAGNOSIS — S39.012D STRAIN OF LUMBAR REGION, SUBSEQUENT ENCOUNTER: Primary | ICD-10-CM

## 2023-04-17 NOTE — PROGRESS NOTES
Outpatient Physical Therapy  [address] [facility], [place] [address]                 Physical Therapy Daily Treatment Note    Patient: Keely Wing   : 1995  Diagnosis/ICD-10 Code:  Strain of lumbar region, subsequent encounter [S39.012D]  Referring practitioner: Rakesh Gillespie MD  Date of Initial Visit: Type: THERAPY  Noted: 2/3/2023  Today's Date: 2023  Patient seen for 9 sessions             Subjective   Keely Wing reports: no changes in her pain.    Pain: 8/10 burning pain, located in back at time of arrival.    Objective   No complaints of increased pain or discomfort.     See Exercise, Manual, and Modality Logs for complete treatment.     Assessment/Plan  Patient was given a green band and given instruction add rows and shoulder extensions to HEP. Keely still experiencing increased back pain that is described as burning and constant. Pt would benefit from skilled PT to address Range of Motion and Strength deficits, pain management and any concerns with ADLs.     Progress per Plan of Care       Timed:  Manual Therapy:    0     mins  23758;  Therapeutic Exercise:    10     mins  82388;     Neuromuscular Pako:    0    mins  51504;    Therapeutic Activity:     15     mins  92129;     Gait Trainin     mins  02656;    Aquatic Therapy:     0     mins  53025;       Untimed:  Electrical Stimulation:    15     mins  73685 ( );  Mechanical Traction:    0     mins  55867;       Timed Treatment:   25   mins   Total Treatment:     40   mins      Electronically signed:   Brigitte Grimm PTA  Physical Therapist Assistant  [facility]ospitals License #: [id number]

## 2023-05-01 ENCOUNTER — TRANSCRIBE ORDERS (OUTPATIENT)
Dept: ADMINISTRATIVE | Facility: HOSPITAL | Age: 28
End: 2023-05-01
Payer: COMMERCIAL

## 2023-05-01 DIAGNOSIS — N63.0 MASS OF BREAST, UNSPECIFIED LATERALITY: Primary | ICD-10-CM

## 2023-05-09 ENCOUNTER — HOSPITAL ENCOUNTER (OUTPATIENT)
Dept: ULTRASOUND IMAGING | Facility: HOSPITAL | Age: 28
Discharge: HOME | End: 2023-05-09
Payer: COMMERCIAL

## 2023-05-09 ENCOUNTER — HOSPITAL ENCOUNTER (OUTPATIENT)
Dept: MAMMOGRAPHY | Facility: HOSPITAL | Age: 28
Discharge: HOME | End: 2023-05-09
Payer: COMMERCIAL

## 2023-05-09 DIAGNOSIS — N63.0 MASS OF BREAST, UNSPECIFIED LATERALITY: ICD-10-CM

## 2023-05-09 PROCEDURE — 76642 ULTRASOUND BREAST LIMITED: CPT

## 2023-05-09 PROCEDURE — G0279 TOMOSYNTHESIS, MAMMO: HCPCS

## 2023-05-09 PROCEDURE — 77066 DX MAMMO INCL CAD BI: CPT

## 2024-10-08 ENCOUNTER — DOCUMENTATION (OUTPATIENT)
Dept: PHYSICAL THERAPY | Facility: CLINIC | Age: 29
End: 2024-10-08
Payer: COMMERCIAL

## 2024-10-08 NOTE — PROGRESS NOTES
Outpatient Physical Therapy  [address] [facility] [place], [place] [address]      Discharge Summary  Discharge Summary from Physical Therapy Report      Dates  PT visit: 2/3/23-4/17/23  Number of Visits: 9       Goals  Plan Goals: LOW BACK PROBLEMS:    1. The patient complains of low back pain.  LTG 1: 12 weeks:  The patient will report a pain rating of 1/10 or better in order to improve  tolerance to activities of daily living and improve sleep quality.  STATUS:  Progressing  STG 1a: 6 weeks:  The patient will report a pain rating of 3/10 or better.  STATUS:  Progressing    2. The patient demonstrates weakness of the bilateral hips.  LTG 2: 12 weeks:  The patient will demonstrate 5 /5 strength for bilateral hip flexion, abduction,  and extension in order to improve hip stability.  STATUS:  Progressing  STG 2a: 6 weeks:  The patient will demonstrate 4+ /5 strength for bilateral hip flexion, abduction,  and extension.  STATUS:  Progressing    3. Mobility: Walking/Moving Around Functional Limitation                   LTG 3: 12 weeks:  The patient will demonstrate 0 % limitation by achieving a score of 0/50 on the KASIE.  STATUS:  Progressing  STG 3 a: 6 weeks:  The patient will demonstrate 10 % limitation by achieving a score of 5/50 on the KASIE.    STATUS:  Progressing    Discharge Plan: Continue with current home exercise program as instructed    Date of Discharge 10/8/2024      Electronically signed:   Dania Turcios PTA  Physical Therapist Assistant  [facility] License #: [id number]